# Patient Record
Sex: MALE | Race: WHITE | NOT HISPANIC OR LATINO | Employment: OTHER | URBAN - METROPOLITAN AREA
[De-identification: names, ages, dates, MRNs, and addresses within clinical notes are randomized per-mention and may not be internally consistent; named-entity substitution may affect disease eponyms.]

---

## 2023-01-12 ENCOUNTER — OFFICE VISIT (OUTPATIENT)
Dept: FAMILY MEDICINE CLINIC | Facility: CLINIC | Age: 48
End: 2023-01-12

## 2023-01-12 VITALS
BODY MASS INDEX: 28.82 KG/M2 | HEIGHT: 67 IN | SYSTOLIC BLOOD PRESSURE: 140 MMHG | HEART RATE: 90 BPM | TEMPERATURE: 98.7 F | DIASTOLIC BLOOD PRESSURE: 80 MMHG | WEIGHT: 183.6 LBS | RESPIRATION RATE: 18 BRPM

## 2023-01-12 DIAGNOSIS — L03.113 CELLULITIS OF RIGHT UPPER EXTREMITY: Primary | ICD-10-CM

## 2023-01-12 RX ORDER — CEFTRIAXONE 1 G/1
1000 INJECTION, POWDER, FOR SOLUTION INTRAMUSCULAR; INTRAVENOUS EVERY 24 HOURS
Status: DISCONTINUED | OUTPATIENT
Start: 2023-01-12 | End: 2023-01-12

## 2023-01-12 RX ORDER — CEFTRIAXONE 1 G/1
1000 INJECTION, POWDER, FOR SOLUTION INTRAMUSCULAR; INTRAVENOUS ONCE
Status: COMPLETED | OUTPATIENT
Start: 2023-01-12 | End: 2023-01-12

## 2023-01-12 RX ORDER — DOXYCYCLINE HYCLATE 100 MG/1
100 CAPSULE ORAL EVERY 12 HOURS SCHEDULED
Qty: 14 CAPSULE | Refills: 0 | Status: SHIPPED | OUTPATIENT
Start: 2023-01-12 | End: 2023-01-16

## 2023-01-12 RX ORDER — CEPHALEXIN 500 MG/1
500 CAPSULE ORAL EVERY 8 HOURS SCHEDULED
Qty: 21 CAPSULE | Refills: 0 | Status: SHIPPED | OUTPATIENT
Start: 2023-01-12 | End: 2023-01-16

## 2023-01-12 RX ADMIN — CEFTRIAXONE 1000 MG: 1 INJECTION, POWDER, FOR SOLUTION INTRAMUSCULAR; INTRAVENOUS at 09:51

## 2023-01-12 RX ADMIN — CEFTRIAXONE 1000 MG: 1 INJECTION, POWDER, FOR SOLUTION INTRAMUSCULAR; INTRAVENOUS at 10:00

## 2023-01-13 NOTE — PROGRESS NOTES
Chief Complaint   Patient presents with   • Mass     Pt c/o mass by elbow right arm and under right armpit x 7 days         Patient ID: Caridad Collado is a 49 y o  male  HPI  Pt is seeing for swelling and redness in R arm x few days -  Worsening -  Has chills and swelling in R armpit  X 1 day -  No therapy tried, h/o Lyme disease in the past -  Works  In [address] [employer]  - no tick bote recalled recently     The following portions of the patient's history were reviewed and updated as appropriate: allergies, current medications, past family history, past medical history, past social history, past surgical history and problem list     Review of Systems   Constitutional: Positive for chills and fever  HENT: Negative  Respiratory: Negative  Cardiovascular: Negative  Gastrointestinal: Negative  Genitourinary: Negative  Current Outpatient Medications   Medication Sig Dispense Refill     No current facility-administered medications for this visit  Objective:    /80 (BP Location: Left arm, Patient Position: Sitting, Cuff Size: Large)   Pulse 90   Temp 98 7 °F (37 1 °C)   Resp 18   Ht 5' 7" (1 702 m)   Wt 83 3 kg (183 lb 9 6 oz)   BMI 28 76 kg/m²        Physical Exam  Musculoskeletal:         General: Swelling (R arm distally and proximally from R elbow ) and tenderness present  Skin:     Findings: Erythema present  Assessment/Plan:         Diagnoses and all orders for this visit:    Cellulitis of right upper extremity  -     doxycycline hyclate (VIBRAMYCIN) 100 mg capsule; Take 1 capsule (100 mg total) by mouth every 12 (twelve) hours for 7 days    -     cephalexin (KEFLEX) 500 mg capsule;  Take 1 capsule (500 mg total) by mouth every 8 (eight) hours for 7 days  -     cefTRIAXone (ROCEPHIN) injection 1,000 mg          rto in 4 days                   Himanshu Morin MD

## 2023-01-16 ENCOUNTER — OFFICE VISIT (OUTPATIENT)
Dept: FAMILY MEDICINE CLINIC | Facility: CLINIC | Age: 48
End: 2023-01-16

## 2023-01-16 VITALS
RESPIRATION RATE: 16 BRPM | TEMPERATURE: 97.5 F | SYSTOLIC BLOOD PRESSURE: 118 MMHG | HEART RATE: 88 BPM | BODY MASS INDEX: 29.03 KG/M2 | HEIGHT: 67 IN | DIASTOLIC BLOOD PRESSURE: 70 MMHG | WEIGHT: 185 LBS

## 2023-01-16 DIAGNOSIS — S50.811D: Primary | ICD-10-CM

## 2023-01-16 DIAGNOSIS — L08.9: Primary | ICD-10-CM
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
## 2023-01-16 RX ORDER — AZITHROMYCIN 250 MG/1
TABLET, FILM COATED ORAL
Qty: 6 TABLET | Refills: 0 | Status: SHIPPED | OUTPATIENT
Start: 2023-01-16 | End: 2023-01-20

## 2023-01-16 NOTE — PROGRESS NOTES
Chief Complaint   Patient presents with   • Follow-up   R arm and R armpit swelling      Patient ID: Natalie Aaron is a 49 y o  male  HPI  Pt is seeing for f/u R arm and R armpit swelling -  Started on Doxy and Keflex for possible cellulitis - recalled cat scratch 1 wk prior to symptoms started  -  Had a fever for 24 h 4 days ago  -  Resolved     The following portions of the patient's history were reviewed and updated as appropriate: allergies, current medications, past family history, past medical history, past social history, past surgical history and problem list     Review of Systems   Constitutional: Negative  Respiratory: Negative  Gastrointestinal: Negative  Skin: Negative for rash  Current Outpatient Medications   Medication Sig Dispense Refill   • cephalexin (KEFLEX) 500 mg capsule Take 1 capsule (500 mg total) by mouth every 8 (eight) hours for 7 days 21 capsule 0   • doxycycline hyclate (VIBRAMYCIN) 100 mg capsule Take 1 capsule (100 mg total) by mouth every 12 (twelve) hours for 7 days 14 capsule 0     No current facility-administered medications for this visit  Objective:    /70 (BP Location: Left arm, Patient Position: Sitting, Cuff Size: Large)   Pulse 88   Temp 97 5 °F (36 4 °C)   Resp 16   Ht 5' 7" (1 702 m)   Wt 83 9 kg (185 lb)   BMI 28 98 kg/m²        Physical Exam  Constitutional:       Appearance: He is not ill-appearing  Musculoskeletal:         General: No swelling or tenderness  Lymphadenopathy:      Upper Body:      Right upper body: Axillary adenopathy and epitrochlear adenopathy present  Skin:     Findings: No erythema  Neurological:      Mental Status: He is alert  Assessment/Plan:         Diagnoses and all orders for this visit:    Cat scratch of right forearm with infection, subsequent encounter  -     azithromycin (ZITHROMAX) 250 mg tablet;  Take 2 tablets today then 1 tablet daily x 4 days  -     Bartonella anitbody panel; Future    will stop Doxy and keflex     Phone f/u in 4 days                      Louann Valdez MD

## 2023-01-18 ENCOUNTER — TELEPHONE (OUTPATIENT)
Dept: FAMILY MEDICINE CLINIC | Facility: CLINIC | Age: 48
End: 2023-01-18

## 2023-01-18 NOTE — TELEPHONE ENCOUNTER
Pl, advise pt -  no results yet - we will call [facility] tomorrow  -  pl, call tomorrow and check if results are back

## 2023-01-18 NOTE — TELEPHONE ENCOUNTER
Patient called for results of labs done this past Monday at [facility] in [place]

## 2023-01-19 LAB
B HENSELAE IGG TITR SER IF: ABNORMAL TITER
B HENSELAE IGM TITR SER IF: NEGATIVE TITER
B QUINTANA IGG TITR SER IF: NEGATIVE TITER
B QUINTANA IGM TITR SER IF: NEGATIVE TITER

## 2023-01-19 NOTE — TELEPHONE ENCOUNTER
Called and spoke with July Kee from [employer]  This specific test had to go to the [address] [facility] office to be processed  She said it is in the final testing stages and they will fax when it is completed